# Patient Record
Sex: FEMALE | Race: WHITE | URBAN - METROPOLITAN AREA
[De-identification: names, ages, dates, MRNs, and addresses within clinical notes are randomized per-mention and may not be internally consistent; named-entity substitution may affect disease eponyms.]

---

## 2017-10-19 ENCOUNTER — EMERGENCY (EMERGENCY)
Facility: HOSPITAL | Age: 52
LOS: 0 days | Discharge: HOME | End: 2017-10-19
Attending: EMERGENCY MEDICINE | Admitting: EMERGENCY MEDICINE
Payer: MEDICARE

## 2017-10-19 VITALS
HEART RATE: 85 BPM | SYSTOLIC BLOOD PRESSURE: 156 MMHG | OXYGEN SATURATION: 100 % | RESPIRATION RATE: 16 BRPM | WEIGHT: 164.91 LBS | TEMPERATURE: 98 F | DIASTOLIC BLOOD PRESSURE: 93 MMHG

## 2017-10-19 PROCEDURE — 73562 X-RAY EXAM OF KNEE 3: CPT | Mod: 26,RT

## 2017-10-19 PROCEDURE — 72040 X-RAY EXAM NECK SPINE 2-3 VW: CPT | Mod: 26

## 2017-10-19 PROCEDURE — 99282 EMERGENCY DEPT VISIT SF MDM: CPT

## 2017-10-19 RX ORDER — METHOTREXATE 2.5 MG/1
0 TABLET ORAL
Qty: 0 | Refills: 0 | COMMUNITY

## 2017-10-19 RX ORDER — GABAPENTIN 400 MG/1
0 CAPSULE ORAL
Qty: 0 | Refills: 0 | COMMUNITY

## 2017-10-19 RX ORDER — ESTROGENS, CONJUGATED 0.625 MG
0 TABLET ORAL
Qty: 0 | Refills: 0 | COMMUNITY

## 2017-10-19 RX ORDER — RANITIDINE HYDROCHLORIDE 150 MG/1
0 TABLET, FILM COATED ORAL
Qty: 0 | Refills: 0 | COMMUNITY

## 2017-10-19 RX ORDER — DULOXETINE HYDROCHLORIDE 30 MG/1
0 CAPSULE, DELAYED RELEASE ORAL
Qty: 0 | Refills: 0 | COMMUNITY

## 2017-10-19 RX ORDER — KETOCONAZOLE 200 MG
0 TABLET ORAL
Qty: 0 | Refills: 0 | COMMUNITY

## 2017-10-19 RX ORDER — IBUPROFEN 200 MG
0 TABLET ORAL
Qty: 0 | Refills: 0 | COMMUNITY

## 2017-10-19 RX ORDER — LOSARTAN POTASSIUM 100 MG/1
0 TABLET, FILM COATED ORAL
Qty: 0 | Refills: 0 | COMMUNITY

## 2017-10-19 RX ORDER — MORPHINE SULFATE AND NALTREXONE HYDROCHLORIDE 50; 2 MG/1; MG/1
0 CAPSULE, EXTENDED RELEASE ORAL
Qty: 0 | Refills: 0 | COMMUNITY

## 2017-10-19 RX ORDER — SOLIFENACIN SUCCINATE 10 MG/1
0 TABLET ORAL
Qty: 0 | Refills: 0 | COMMUNITY

## 2017-10-19 RX ORDER — DICLOFENAC SODIUM 75 MG/1
0 TABLET, DELAYED RELEASE ORAL
Qty: 0 | Refills: 0 | COMMUNITY

## 2017-10-19 RX ORDER — IBUPROFEN 200 MG
800 TABLET ORAL ONCE
Qty: 0 | Refills: 0 | Status: COMPLETED | OUTPATIENT
Start: 2017-10-19 | End: 2017-10-19

## 2017-10-19 RX ORDER — OXYBUTYNIN CHLORIDE 5 MG
0 TABLET ORAL
Qty: 0 | Refills: 0 | COMMUNITY

## 2017-10-19 RX ADMIN — Medication 800 MILLIGRAM(S): at 16:50

## 2017-10-19 NOTE — ED STATDOCS - MUSCULOSKELETAL, MLM
posterior C/T/L paraspinal TTP, no midline tenderness, no step off deformities, R knee joint line tenderness, no deformity, no bony RUE tenderness.
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
## 2017-10-19 NOTE — ED STATDOCS - PHYSICAL EXAMINATION
GEN: AOX3, NAD. HEENT: Throat clear. Head NC/AT. NECK: Supple, +Mild muscular tenderness lower posterior neck paravertebral area. No JVD. FROM. C-spine non-tender. CV:S1S2, RRR, LUNGS: CTA/b/l, no w/r/r. ABD: Soft, NT/ND, no rebound, no guarding. No CVAT. EXT: No e/c/c. 2+ distal pulses. RIGHT KNEE: +Mild tenderness right knee. FROM with mild pain. No deformity. NVI. 2+ distal pulses. SKIN: No rashes. NEURO: No focal deficits. CN II-XII intact. FROM. 5/5 motor and sensory. RAMYA Chavez

## 2017-10-19 NOTE — ED STATDOCS - MEDICAL DECISION MAKING DETAILS
XR knee WNL.  Back an neck pain likely 2/2 muscular pain.  Already taking percocet and motrin at home.  Okay for d/c home at this time.
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
## 2017-10-19 NOTE — ED STATDOCS - PROGRESS NOTE DETAILS
Patient is feeling much better, tests/labs reviewed. case discussed with attending. OK to dc home. RAMYA Chavez

## 2017-10-19 NOTE — ED STATDOCS - CARE PLAN
Principal Discharge DX:	Knee injury  Instructions for follow-up, activity and diet:	patient already takes Percocet/Motrin at home for her arthritis, will continue same.  Secondary Diagnosis:	Neck sprain
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
## 2017-10-20 DIAGNOSIS — S13.9XXA SPRAIN OF JOINTS AND LIGAMENTS OF UNSPECIFIED PARTS OF NECK, INITIAL ENCOUNTER: ICD-10-CM
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
## 2017-10-20 DIAGNOSIS — M54.2 CERVICALGIA: ICD-10-CM

## 2017-10-20 DIAGNOSIS — M79.7 FIBROMYALGIA: ICD-10-CM

## 2017-10-20 DIAGNOSIS — S89.91XA UNSPECIFIED INJURY OF RIGHT LOWER LEG, INITIAL ENCOUNTER: ICD-10-CM

## 2017-10-20 DIAGNOSIS — M19.90 UNSPECIFIED OSTEOARTHRITIS, UNSPECIFIED SITE: ICD-10-CM

## 2018-11-27 ENCOUNTER — EMERGENCY (EMERGENCY)
Facility: HOSPITAL | Age: 53
LOS: 0 days | Discharge: HOME | End: 2018-11-28
Attending: EMERGENCY MEDICINE | Admitting: EMERGENCY MEDICINE
Payer: MEDICARE

## 2018-11-27 VITALS
HEIGHT: 62 IN | WEIGHT: 186.07 LBS | SYSTOLIC BLOOD PRESSURE: 170 MMHG | TEMPERATURE: 99 F | DIASTOLIC BLOOD PRESSURE: 90 MMHG | HEART RATE: 78 BPM | RESPIRATION RATE: 18 BRPM | OXYGEN SATURATION: 100 %
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
## 2018-11-27 DIAGNOSIS — S09.90XA UNSPECIFIED INJURY OF HEAD, INITIAL ENCOUNTER: ICD-10-CM

## 2018-11-27 DIAGNOSIS — M12.9 ARTHROPATHY, UNSPECIFIED: ICD-10-CM

## 2018-11-27 DIAGNOSIS — M50.30 OTHER CERVICAL DISC DEGENERATION, UNSPECIFIED CERVICAL REGION: ICD-10-CM
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
## 2018-11-27 DIAGNOSIS — M79.7 FIBROMYALGIA: ICD-10-CM

## 2018-11-27 PROCEDURE — 99284 EMERGENCY DEPT VISIT MOD MDM: CPT | Mod: 25

## 2018-11-27 PROCEDURE — 72125 CT NECK SPINE W/O DYE: CPT | Mod: 26

## 2018-11-27 PROCEDURE — 70450 CT HEAD/BRAIN W/O DYE: CPT | Mod: 26

## 2018-11-27 RX ORDER — OXYCODONE AND ACETAMINOPHEN 5; 325 MG/1; MG/1
1 TABLET ORAL ONCE
Qty: 0 | Refills: 0 | Status: DISCONTINUED | OUTPATIENT
Start: 2018-11-27 | End: 2018-11-27

## 2018-11-27 RX ADMIN — OXYCODONE AND ACETAMINOPHEN 1 TABLET(S): 5; 325 TABLET ORAL at 23:37

## 2018-11-27 RX ADMIN — OXYCODONE AND ACETAMINOPHEN 1 TABLET(S): 5; 325 TABLET ORAL at 22:37

## 2018-11-27 NOTE — ED PROVIDER NOTE - OBJECTIVE STATEMENT
Pt is a 52 y/o F, with PMHx arthritis and fibromyalgia, BIBEMS s/p fall at home today. Pt was walking down the steps in socks when she tripped and slipped down the last two steps of the staircase. + head trauma, no LOC. Pt p/w swelling to the back of her head, HA, and mild posterior neck pain. denies dizziness and any other injuries. No anticoagulation. She has been ambulatory since the fall.

## 2018-11-27 NOTE — ED ADULT NURSE NOTE - CHPI ED NUR SYMPTOMS NEG
no nausea/no tingling/no weakness/no fever/no decreased eating/drinking/no chills/no vomiting/no dizziness

## 2018-11-27 NOTE — ED ADULT NURSE NOTE - NSIMPLEMENTINTERV_GEN_ALL_ED
Implemented All Fall Risk Interventions:  [place] to call system. Call bell, personal items and telephone within reach. Instruct patient to call for assistance. Room bathroom lighting operational. Non-slip footwear when patient is off stretcher. Physically safe environment: no spills, clutter or unnecessary equipment. Stretcher in lowest position, wheels locked, appropriate side rails in place. Provide visual cue, wrist band, yellow gown, etc. Monitor gait and stability. Monitor for mental status changes and reorient to person, place, and time. Review medications for side effects contributing to fall risk. Reinforce activity limits and safety measures with patient and family.

## 2018-11-27 NOTE — ED PROVIDER NOTE - CARE PLAN
Principal Discharge DX:	Head trauma, initial encounter  Secondary Diagnosis:	Contusion of head, unspecified part of head, initial encounter

## 2018-11-28 VITALS
RESPIRATION RATE: 19 BRPM | SYSTOLIC BLOOD PRESSURE: 157 MMHG | TEMPERATURE: 98 F | DIASTOLIC BLOOD PRESSURE: 89 MMHG | OXYGEN SATURATION: 100 % | HEART RATE: 71 BPM

## 2018-11-28 PROBLEM — M79.7 FIBROMYALGIA: Chronic | Status: ACTIVE | Noted: 2017-10-19

## 2018-11-28 PROBLEM — M19.90 UNSPECIFIED OSTEOARTHRITIS, UNSPECIFIED SITE: Chronic | Status: ACTIVE | Noted: 2017-10-19

## 2018-11-28 RX ORDER — OXYCODONE AND ACETAMINOPHEN 5; 325 MG/1; MG/1
1 TABLET ORAL ONCE
Qty: 0 | Refills: 0 | Status: DISCONTINUED | OUTPATIENT
Start: 2018-11-28 | End: 2018-11-28

## 2018-11-28 RX ADMIN — OXYCODONE AND ACETAMINOPHEN 1 TABLET(S): 5; 325 TABLET ORAL at 00:23

## 2020-12-21 ENCOUNTER — EMERGENCY (EMERGENCY)
Facility: HOSPITAL | Age: 55
LOS: 0 days | Discharge: HOME | End: 2020-12-21
Attending: EMERGENCY MEDICINE
Payer: MEDICARE

## 2020-12-21 ENCOUNTER — TRANSCRIPTION ENCOUNTER (OUTPATIENT)
Age: 55
End: 2020-12-21

## 2020-12-21 VITALS
WEIGHT: 160.06 LBS | DIASTOLIC BLOOD PRESSURE: 75 MMHG | SYSTOLIC BLOOD PRESSURE: 155 MMHG | HEIGHT: 62 IN | RESPIRATION RATE: 18 BRPM | TEMPERATURE: 98 F | OXYGEN SATURATION: 100 % | HEART RATE: 79 BPM

## 2020-12-21 VITALS
SYSTOLIC BLOOD PRESSURE: 143 MMHG | DIASTOLIC BLOOD PRESSURE: 85 MMHG | TEMPERATURE: 98 F | RESPIRATION RATE: 16 BRPM | HEART RATE: 78 BPM | OXYGEN SATURATION: 100 %

## 2020-12-21 DIAGNOSIS — R53.83 OTHER FATIGUE: ICD-10-CM

## 2020-12-21 DIAGNOSIS — Z20.828 CONTACT WITH AND (SUSPECTED) EXPOSURE TO OTHER VIRAL COMMUNICABLE DISEASES: ICD-10-CM

## 2020-12-21 DIAGNOSIS — R53.81 OTHER MALAISE: ICD-10-CM

## 2020-12-21 DIAGNOSIS — B34.9 VIRAL INFECTION, UNSPECIFIED: ICD-10-CM

## 2020-12-21 DIAGNOSIS — M79.7 FIBROMYALGIA: ICD-10-CM

## 2020-12-21 DIAGNOSIS — I10 ESSENTIAL (PRIMARY) HYPERTENSION: ICD-10-CM

## 2020-12-21 DIAGNOSIS — M19.90 UNSPECIFIED OSTEOARTHRITIS, UNSPECIFIED SITE: ICD-10-CM

## 2020-12-21 PROCEDURE — 93005 ELECTROCARDIOGRAM TRACING: CPT

## 2020-12-21 PROCEDURE — 99284 EMERGENCY DEPT VISIT MOD MDM: CPT | Mod: CS

## 2020-12-21 PROCEDURE — 99283 EMERGENCY DEPT VISIT LOW MDM: CPT | Mod: 25

## 2020-12-21 PROCEDURE — 93010 ELECTROCARDIOGRAM REPORT: CPT

## 2020-12-21 PROCEDURE — 71045 X-RAY EXAM CHEST 1 VIEW: CPT | Mod: 26

## 2020-12-21 PROCEDURE — 71045 X-RAY EXAM CHEST 1 VIEW: CPT

## 2020-12-21 RX ORDER — SODIUM CHLORIDE 9 MG/ML
1000 INJECTION INTRAMUSCULAR; INTRAVENOUS; SUBCUTANEOUS ONCE
Refills: 0 | Status: COMPLETED | OUTPATIENT
Start: 2020-12-21 | End: 2020-12-21

## 2020-12-21 RX ADMIN — SODIUM CHLORIDE 1000 MILLILITER(S): 9 INJECTION INTRAMUSCULAR; INTRAVENOUS; SUBCUTANEOUS at 17:58

## 2020-12-21 NOTE — ED ADULT NURSE NOTE - CHPI ED NUR SYMPTOMS POS
previous_biopsy_has_been_previously_biopsied Body Location Override (Optional): Left Inferior Medial Mid Back WEAKNESS

## 2020-12-21 NOTE — ED ADULT NURSE NOTE - CAS EDN DISCHARGE ASSESSMENT
no acute respiratory distress, respirations even and unlabored, ambulating with steady gait, voiding without difficulty/Alert and oriented to person, place and time

## 2020-12-21 NOTE — ED ADULT TRIAGE NOTE - CHIEF COMPLAINT QUOTE
Pt BIBA EMS s/p positive Covid test and "not feeling well, found to be hypotensive upper 80's systolic, sent from urgent care. IV access established prior to arrival by EMS #18 right arm. Pt now 155/75 s/p IV fluids. Pt denies chest pain or SOB.

## 2020-12-21 NOTE — ED PROVIDER NOTE - CLINICAL SUMMARY MEDICAL DECISION MAKING FREE TEXT BOX
Pt states she took her morphine, percocet and Losartan all at once later than her usual routine and while at Urgent Care felt lightheaded and BP was low so  sent pt to the ED. Pt received 1 L normal saline by EMS and feels back to baseline and wants to go home. Pt tested COVID + at . Pt states she took her morphine, percocet and Losartan all at once later than her usual routine and while at Urgent Care felt lightheaded and BP was low so  sent pt to the ED. Pt received 1 L normal saline by EMS and feels back to baseline and wants to go home. Pt tested COVID + at . Plan; IV fluids, CXR, EKG. Pt states she took her morphine, percocet and Losartan all at once later than her usual routine and while at Urgent Care felt lightheaded and BP was low so  sent pt to the ED. Pt received 1 L normal saline by EMS and feels back to baseline and wants to go home. Pt tested COVID + at . normal o2 sat at rest & with exertion. Plan; IV fluids, CXR, EKG.

## 2020-12-21 NOTE — ED PROVIDER NOTE - PHYSICAL EXAMINATION
*GEN: No acute distress, well appearing   *HEAD: Normocephalic, Atraumatic  *EYES/NOSE: b/l Pupils symmetric & Reactive to ligth, EOMI b/l  *THROAT: airway patent, moist mucous membranes  *NECK: Neck supple  *PULMONARY: No Respiratory distress, symmetric b/l chest rise  *CARDIAC: s1s2, regular rhythm   *ABDOMEN:  Non Tender, Non Distended, soft, no guarding, no rebound, no masses   *BACK: no CVA tenderness, No midline vertebral tenderness to palpation   *EXTREMITIES: symmetric pulses, 2+ DP & radial pulses, no cyanosis, no edema   *SKIN: no rash, no bruising   *NEUROLOGIC: alert,  full active & passive ROM in all 4 extremities,   *PSYCH: appropriate concern about symptoms, pleasant *GEN: No acute distress, well appearing   *HEAD: Normocephalic, Atraumatic  *EYES/NOSE: b/l Pupils symmetric & Reactive to ligth, EOMI b/l  *THROAT: airway patent, moist mucous membranes  *NECK: Neck supple  *PULMONARY: No Respiratory distress, symmetric b/l chest rise  *CARDIAC: s1s2, regular rhythm   *ABDOMEN:  Non Tender, Non Distended, soft, no guarding, no rebound, no masses   *BACK: no CVA tenderness, No midline vertebral tenderness to palpation   *EXTREMITIES: symmetric pulses, 2+ DP & radial pulses, no cyanosis, no edema   *SKIN: no rash, no bruising   *NEUROLOGIC: CN 2-12 intact, normal finger to nose & heel to shin; equal & normal strength & sensation in b/l UE/LE; full active & passive ROM in all extremeties,  no pronator drift, normal patellar reflex, normal gait,   *PSYCH: appropriate concern about symptoms, pleasant

## 2020-12-21 NOTE — ED PROVIDER NOTE - OBJECTIVE STATEMENT
Pertinent HPI/PMH/PSH/FHx/SHx and Review of Systems contained within  HPI: 56 y/o Patient p/w CC generalized fatigue and malaise x 2 days, new onset. Also with mild cough and diffuse myalgia. Family members at home are COVID +. Pt states she took her morphine, percocet and Losartan all at once later than her usual routine and while at Urgent Care felt lightheaded and BP was low so  sent pt to the ED. Pt tested COVID + at . Pt received 1 L normal saline by EMS and feels back to baseline and wants to go home.   PMH/PSH relevant for: RA on Methotrexate and received anti inflammatory shot 2x weekly, HTN, GERD, Fibromyalgia  ROS negative for: fever, Chest pain, SOB, Nausea, vomiting, diarrhea, abdominal pain, dysuria    FamilyHx and SocialHx not otherwise contributory Pertinent HPI/PMH/PSH/FHx/SHx and Review of Systems contained within  HPI: 54 y/o female p/w CC generalized fatigue and malaise x 2 days, new onset. Also with mild cough and diffuse myalgia. Family members at home are COVID +. Pt states she took her morphine, percocet and Losartan all at once later than her usual routine and while at Urgent Care felt lightheaded and BP was low so  sent pt to the ED. Pt tested COVID + at . Pt received 1 L normal saline by EMS and feels back to baseline and wants to go home.   PMH/PSH relevant for: RA on Methotrexate and received anti inflammatory shot 2x weekly, HTN, GERD, Fibromyalgia  ROS negative for: fever, Chest pain, SOB, Nausea, vomiting, diarrhea, abdominal pain, dysuria    FamilyHx and SocialHx not otherwise contributory Pertinent HPI/PMH/PSH/FHx/SHx and Review of Systems contained within  HPI: 54 y/o female p/w CC generalized fatigue and malaise x 2 days, new onset. Also with mild cough and diffuse myalgia. Family members at home are COVID +. Pt tested COVID + at . Pt states she took her morphine, percocet and Losartan all at once later than her usual routine and while at Urgent Care felt lightheaded and BP was low so  sent pt to the ED. Pt received 1 L normal saline by EMS and feels back to baseline and wants to go home.   PMH/PSH relevant for: RA on Methotrexate and received anti inflammatory shot 2x weekly, HTN, GERD, Fibromyalgia  ROS negative for: fever, Chest pain, SOB, Nausea, vomiting, diarrhea, abdominal pain, dysuria    FamilyHx and SocialHx not otherwise contributory

## 2020-12-21 NOTE — ED PROVIDER NOTE - NS ED ROS FT
Review of Systems:  	•	CONSTITUTIONAL: no fever, +fatigue   	•	SKIN: no rash  	•	RESPIRATORY: no shortness of breath, +cough  	•	CARDIAC: no chest pain  	•	GI:  no abd pain, no nausea, no vomiting, no diarrhea  	•	GENITO-URINARY:  no dysuria  	•	MUSCULOSKELETAL:  no back pain, +myalgia  	•	NEUROLOGIC: no weakness  	•	ALLERGY: no rhinorrhea  	•	PSYSCHIATRIC: appropriate concern about symptoms

## 2020-12-21 NOTE — ED PROVIDER NOTE - PATIENT PORTAL LINK FT
You can access the FollowMyHealth Patient Portal offered by [facility] by registering at the following website: [email or web address]. By joining [facility]’s FollowMyHealth portal, you will also be able to view your health information using other applications (apps) compatible with our system.

## 2021-04-12 ENCOUNTER — EMERGENCY (EMERGENCY)
Facility: HOSPITAL | Age: 56
LOS: 0 days | Discharge: HOME | End: 2021-04-13
Attending: HOSPITALIST
Payer: MEDICARE

## 2021-04-12 VITALS
TEMPERATURE: 99 F | OXYGEN SATURATION: 97 % | DIASTOLIC BLOOD PRESSURE: 66 MMHG | RESPIRATION RATE: 18 BRPM | SYSTOLIC BLOOD PRESSURE: 103 MMHG | HEART RATE: 81 BPM

## 2021-04-12 VITALS — WEIGHT: 164.91 LBS | HEIGHT: 62 IN

## 2021-04-12 DIAGNOSIS — R19.7 DIARRHEA, UNSPECIFIED: ICD-10-CM

## 2021-04-12 DIAGNOSIS — R10.9 UNSPECIFIED ABDOMINAL PAIN: ICD-10-CM

## 2021-04-12 DIAGNOSIS — I10 ESSENTIAL (PRIMARY) HYPERTENSION: ICD-10-CM

## 2021-04-12 DIAGNOSIS — N39.0 URINARY TRACT INFECTION, SITE NOT SPECIFIED: ICD-10-CM

## 2021-04-12 DIAGNOSIS — Z20.822 CONTACT WITH AND (SUSPECTED) EXPOSURE TO COVID-19: ICD-10-CM

## 2021-04-12 DIAGNOSIS — M79.7 FIBROMYALGIA: ICD-10-CM

## 2021-04-12 DIAGNOSIS — Z79.891 LONG TERM (CURRENT) USE OF OPIATE ANALGESIC: ICD-10-CM

## 2021-04-12 DIAGNOSIS — M19.90 UNSPECIFIED OSTEOARTHRITIS, UNSPECIFIED SITE: ICD-10-CM

## 2021-04-12 DIAGNOSIS — K52.9 NONINFECTIVE GASTROENTERITIS AND COLITIS, UNSPECIFIED: ICD-10-CM

## 2021-04-12 LAB
ALBUMIN SERPL ELPH-MCNC: 3.5 G/DL — SIGNIFICANT CHANGE UP (ref 3.3–5)
ALP SERPL-CCNC: 74 U/L — SIGNIFICANT CHANGE UP (ref 40–120)
ALT FLD-CCNC: 48 U/L — SIGNIFICANT CHANGE UP (ref 12–78)
ANION GAP SERPL CALC-SCNC: 5 MMOL/L — SIGNIFICANT CHANGE UP (ref 5–17)
APPEARANCE UR: ABNORMAL
AST SERPL-CCNC: 38 U/L — HIGH (ref 15–37)
BASOPHILS # BLD AUTO: 0.05 K/UL — SIGNIFICANT CHANGE UP (ref 0–0.2)
BASOPHILS NFR BLD AUTO: 0.4 % — SIGNIFICANT CHANGE UP (ref 0–2)
BILIRUB SERPL-MCNC: 0.7 MG/DL — SIGNIFICANT CHANGE UP (ref 0.2–1.2)
BILIRUB UR-MCNC: ABNORMAL
BUN SERPL-MCNC: 28 MG/DL — HIGH (ref 7–23)
CALCIUM SERPL-MCNC: 8.7 MG/DL — SIGNIFICANT CHANGE UP (ref 8.5–10.1)
CHLORIDE SERPL-SCNC: 100 MMOL/L — SIGNIFICANT CHANGE UP (ref 96–108)
CO2 SERPL-SCNC: 25 MMOL/L — SIGNIFICANT CHANGE UP (ref 22–31)
COLOR SPEC: ABNORMAL
CREAT SERPL-MCNC: 0.7 MG/DL — SIGNIFICANT CHANGE UP (ref 0.5–1.3)
DIFF PNL FLD: ABNORMAL
EOSINOPHIL # BLD AUTO: 0.13 K/UL — SIGNIFICANT CHANGE UP (ref 0–0.5)
EOSINOPHIL NFR BLD AUTO: 0.9 % — SIGNIFICANT CHANGE UP (ref 0–6)
GLUCOSE SERPL-MCNC: 141 MG/DL — HIGH (ref 70–99)
GLUCOSE UR QL: NEGATIVE MG/DL — SIGNIFICANT CHANGE UP
HCT VFR BLD CALC: 43.9 % — SIGNIFICANT CHANGE UP (ref 34.5–45)
HGB BLD-MCNC: 15 G/DL — SIGNIFICANT CHANGE UP (ref 11.5–15.5)
IMM GRANULOCYTES NFR BLD AUTO: 0.2 % — SIGNIFICANT CHANGE UP (ref 0–1.5)
KETONES UR-MCNC: ABNORMAL
LACTATE SERPL-SCNC: 1.7 MMOL/L — SIGNIFICANT CHANGE UP (ref 0.7–2)
LEUKOCYTE ESTERASE UR-ACNC: ABNORMAL
LIDOCAIN IGE QN: 64 U/L — LOW (ref 73–393)
LYMPHOCYTES # BLD AUTO: 1.56 K/UL — SIGNIFICANT CHANGE UP (ref 1–3.3)
LYMPHOCYTES # BLD AUTO: 11.3 % — LOW (ref 13–44)
MCHC RBC-ENTMCNC: 33.6 PG — SIGNIFICANT CHANGE UP (ref 27–34)
MCHC RBC-ENTMCNC: 34.2 GM/DL — SIGNIFICANT CHANGE UP (ref 32–36)
MCV RBC AUTO: 98.2 FL — SIGNIFICANT CHANGE UP (ref 80–100)
MONOCYTES # BLD AUTO: 0.72 K/UL — SIGNIFICANT CHANGE UP (ref 0–0.9)
MONOCYTES NFR BLD AUTO: 5.2 % — SIGNIFICANT CHANGE UP (ref 2–14)
NEUTROPHILS # BLD AUTO: 11.31 K/UL — HIGH (ref 1.8–7.4)
NEUTROPHILS NFR BLD AUTO: 82 % — HIGH (ref 43–77)
NITRITE UR-MCNC: POSITIVE
PH UR: 5 — SIGNIFICANT CHANGE UP (ref 5–8)
PLATELET # BLD AUTO: 273 K/UL — SIGNIFICANT CHANGE UP (ref 150–400)
POTASSIUM SERPL-MCNC: 4.3 MMOL/L — SIGNIFICANT CHANGE UP (ref 3.5–5.3)
POTASSIUM SERPL-SCNC: 4.3 MMOL/L — SIGNIFICANT CHANGE UP (ref 3.5–5.3)
PROT SERPL-MCNC: 7 GM/DL — SIGNIFICANT CHANGE UP (ref 6–8.3)
PROT UR-MCNC: 15 MG/DL
RBC # BLD: 4.47 M/UL — SIGNIFICANT CHANGE UP (ref 3.8–5.2)
RBC # FLD: 12.4 % — SIGNIFICANT CHANGE UP (ref 10.3–14.5)
SODIUM SERPL-SCNC: 130 MMOL/L — LOW (ref 135–145)
SP GR SPEC: 1.02 — SIGNIFICANT CHANGE UP (ref 1.01–1.02)
UROBILINOGEN FLD QL: 4 MG/DL
WBC # BLD: 13.8 K/UL — HIGH (ref 3.8–10.5)
WBC # FLD AUTO: 13.8 K/UL — HIGH (ref 3.8–10.5)

## 2021-04-12 PROCEDURE — 81001 URINALYSIS AUTO W/SCOPE: CPT

## 2021-04-12 PROCEDURE — 96374 THER/PROPH/DIAG INJ IV PUSH: CPT | Mod: XU

## 2021-04-12 PROCEDURE — 83690 ASSAY OF LIPASE: CPT

## 2021-04-12 PROCEDURE — 85025 COMPLETE CBC W/AUTO DIFF WBC: CPT

## 2021-04-12 PROCEDURE — 99284 EMERGENCY DEPT VISIT MOD MDM: CPT | Mod: 25

## 2021-04-12 PROCEDURE — 83605 ASSAY OF LACTIC ACID: CPT

## 2021-04-12 PROCEDURE — 74177 CT ABD & PELVIS W/CONTRAST: CPT | Mod: 26

## 2021-04-12 PROCEDURE — 0225U NFCT DS DNA&RNA 21 SARSCOV2: CPT

## 2021-04-12 PROCEDURE — 99284 EMERGENCY DEPT VISIT MOD MDM: CPT | Mod: CS

## 2021-04-12 PROCEDURE — 87186 SC STD MICRODIL/AGAR DIL: CPT

## 2021-04-12 PROCEDURE — 80053 COMPREHEN METABOLIC PANEL: CPT

## 2021-04-12 PROCEDURE — 87086 URINE CULTURE/COLONY COUNT: CPT

## 2021-04-12 PROCEDURE — 36415 COLL VENOUS BLD VENIPUNCTURE: CPT

## 2021-04-12 PROCEDURE — 74177 CT ABD & PELVIS W/CONTRAST: CPT

## 2021-04-12 RX ORDER — SODIUM CHLORIDE 9 MG/ML
1000 INJECTION INTRAMUSCULAR; INTRAVENOUS; SUBCUTANEOUS ONCE
Refills: 0 | Status: COMPLETED | OUTPATIENT
Start: 2021-04-12 | End: 2021-04-12

## 2021-04-12 RX ADMIN — SODIUM CHLORIDE 1000 MILLILITER(S): 9 INJECTION INTRAMUSCULAR; INTRAVENOUS; SUBCUTANEOUS at 22:24

## 2021-04-12 NOTE — ED STATDOCS - OBJECTIVE STATEMENT
56 y/o female with PMHx of Fibromyalgia and HTN presenting with diarrhea since 1pm and abd pain today. Pt also c/o runny nose and mild headache. Pt was concerned for COVID, but reports earlier COVID episode and states she's currently fully vaccinated. PT states she went to urgent care and was sent to ED for further evaluation. Pt noted to be tender to RLQ. Denies fevers. Pt currently on Prednisone x 1 week, 5mg.

## 2021-04-12 NOTE — ED STATDOCS - PHYSICAL EXAMINATION
PA NOTE: GEN: AOX3, NAD. HEENT: Throat clear. Airway is patent. EYES: PERRLA. EOMI. Head: NC/AT. NECK: Supple, No JVD. FROM. C-spine non-tender. CV:S1S2, RRR, LUNGS: Non-labored breathing, no tachypnea. O2sat 100% RA. CTA b/l. No w/r/r. CHEST: Equal chest expansion and rise. No deformity. ABD: Soft, +Mild tenderness lower abd/suprapubic area. No true McBurney's point tenderness. No rebound, no guarding. No CVAT. EXT: No e/c/c. 2+ distal pulses. SKIN: No rashes. NEURO: No focal deficits. CN II-XII intact. FROM. 5/5 motor and sensory. ~Yohannes Pena PA-C

## 2021-04-12 NOTE — ED ADULT NURSE NOTE - OBJECTIVE STATEMENT
54 y/o female with PMHx of Fibromyalgia and HTN presenting with diarrhea since 1pm and abd pain today. Pt also c/o runny nose and mild headache. Pt was concerned for COVID, but reports earlier COVID episode and states she's currently fully vaccinated. PT states she went to urgent care and was sent to ED for further evaluation. Pt noted to be tender to RLQ. Denies fevers. Pt currently on Prednisone x 1 week, 5mg.

## 2021-04-12 NOTE — ED STATDOCS - CARE PROVIDER_API CALL
Jeannine Benavides  GASTROENTEROLOGY  [address], [address]  [place] [address]  Phone: [phone]  Fax: [phone]  Follow Up Time: 1-3 Days

## 2021-04-12 NOTE — ED STATDOCS - NSFOLLOWUPINSTRUCTIONS_ED_ALL_ED_FT
Please take both antibiotics as prescribed  recommend a light diet while you are healing  please return for any worsening pain or fever  please follow up with gastroenterology as below.
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
## 2021-04-12 NOTE — ED ADULT NURSE NOTE - NSFALLRSKINDICATORS_ED_ALL_ED
Patient seen and examined. Agree with above. The PEG tube itself is working fine - we injected 120cc of water and there's no leaking from the tube itself. The area surrounding and beneath the external bumper of the PEG is ulcerated and irritated. The tract also appears to be ulcerated - therefore it is the skin around the PEG that is the issue right now, not the PEG itself. Would first obtain wound care eval for this area to dress it appropriately. If it does not heal, we might have to remove PEG to allow area to heal and replace it at a later time. Exchanging the PEG to a bigger size would not help the already ulcerated/irriated skin already present. no

## 2021-04-12 NOTE — ED STATDOCS - PROGRESS NOTE DETAILS
PA: Patient is a 56 y/o female with PMHx of Fibromyalgia and HTN who presents to ED c/o diarrhea since 1pm and abd pain today. Patient also c/o runny nose and mild headache. Patient first went to urgent care and was sent to ED for further evaluation. Patient takes Prednisone for arthritis/Fibromyalgia. ~Yohannes Pena PA-C   Patient seen and evaluated in . Will get labs, CT. Reassess. ~Yohannes Pena PA-C PA: WBC slightly elevated. Waiting on CT. ~Yohannes Pena PA-C +UTI on urinalysis. Rocephin 1gm IV x1 given in ED. Waiting on CT. ~Yohannes Pena PA-C PA: CT done. Results pending. Care transitioned to Dr. Marsh. ~Yohannes Pena PA-C

## 2021-04-12 NOTE — ED STATDOCS - NS_ ATTENDINGSCRIBEDETAILS _ED_A_ED_FT
Shanna Long MD: The history, relevant review of systems, past medical and surgical history, medical decision making, and physical examination was documented by the scribe in my presence and I attest to the accuracy of the documentation.

## 2021-04-12 NOTE — ED STATDOCS - DATE/TIME 3
Refill request for Losartan    Last OV was 10/12/18    Last refill was 10/12/18 disp 90 refills 1    Prescription e-prescribed to pharmacy per Physician protocol    PSR, please notify patient due for appointment with Isabelle Loza DO       12-Apr-2021 23:24

## 2021-04-12 NOTE — ED STATDOCS - NS ED ATTENDING STATEMENT MOD
"Pt stated, \"I have been having stomach pain since last night around 9 pm it started with no relieve. I had a hard bowel movent at 3 am.\"  " Attending with

## 2021-04-12 NOTE — ED STATDOCS - CLINICAL SUMMARY MEDICAL DECISION MAKING FREE TEXT BOX
56 y/o female with diarrhea and RLQ pain. Pt is well appearing. Labs, CT, IV fluids. PT declining pain meds at this time. 56 y/o female with diarrhea and RLQ pain. Pt is well appearing. Labs, CT, IV fluids. PT declining pain meds at this time.    PA: Patient presents with lower abd pain. +UTI, Rocephin IV x 1 given in ED. CT done. Results pending. Care transitioned to Dr. Marsh. ~Yohannes Pena PA-C

## 2021-04-12 NOTE — ED STATDOCS - PATIENT PORTAL LINK FT
You can access the FollowMyHealth Patient Portal offered by [facility] by registering at the following website: [email or web address]. By joining [facility]’s FollowMyHealth portal, you will also be able to view your health information using other applications (apps) compatible with our system.

## 2021-04-12 NOTE — ED ADULT NURSE NOTE - CHIEF COMPLAINT QUOTE
Pt comes to the ED complaining of abdominal pain. Pt was sent from [facility] for a CT Scan and further evaluation.

## 2021-04-13 PROBLEM — I10 ESSENTIAL (PRIMARY) HYPERTENSION: Chronic | Status: ACTIVE | Noted: 2020-12-23

## 2021-04-13 LAB
RAPID RVP RESULT: SIGNIFICANT CHANGE UP
SARS-COV-2 RNA SPEC QL NAA+PROBE: SIGNIFICANT CHANGE UP

## 2021-04-13 RX ORDER — CEFTRIAXONE 500 MG/1
1000 INJECTION, POWDER, FOR SOLUTION INTRAMUSCULAR; INTRAVENOUS ONCE
Refills: 0 | Status: COMPLETED | OUTPATIENT
Start: 2021-04-12 | End: 2021-04-12

## 2021-04-13 RX ORDER — METRONIDAZOLE 500 MG
500 TABLET ORAL ONCE
Refills: 0 | Status: COMPLETED | OUTPATIENT
Start: 2021-04-13 | End: 2021-04-13

## 2021-04-13 RX ORDER — CIPROFLOXACIN LACTATE 400MG/40ML
500 VIAL (ML) INTRAVENOUS ONCE
Refills: 0 | Status: COMPLETED | OUTPATIENT
Start: 2021-04-13 | End: 2021-04-13

## 2021-04-13 RX ORDER — MOXIFLOXACIN HYDROCHLORIDE TABLETS, 400 MG 400 MG/1
1 TABLET, FILM COATED ORAL
Qty: 20 | Refills: 0
Start: 2021-04-13 | End: 2021-04-22

## 2021-04-13 RX ORDER — METRONIDAZOLE 500 MG
1 TABLET ORAL
Qty: 30 | Refills: 0
Start: 2021-04-13 | End: 2021-04-22

## 2021-04-13 RX ADMIN — CEFTRIAXONE 1000 MILLIGRAM(S): 500 INJECTION, POWDER, FOR SOLUTION INTRAMUSCULAR; INTRAVENOUS at 00:41

## 2021-04-13 RX ADMIN — Medication 500 MILLIGRAM(S): at 01:09

## 2021-07-14 NOTE — ED PROVIDER NOTE - NSFOLLOWUPINSTRUCTIONS_ED_ALL_ED_FT
Yes please follow up with your doctor in 2-3 days.   may apply ice for comfort.   return to ED for any concerns

## 2021-08-06 NOTE — ED PROVIDER NOTE - DISCHARGE DATE
[facility] Cardiology  Progress Note    Timo Olivo Patient Status:  Observation    10/28/1967 MRN [id number]   [facility] 2NE-A Attending Harish Vergara MD   Hosp Day # 0 PCP Emmanuel Watson MD     Impression:  1. CP, SILVIANO antacid (TUMS) chewable tab 500-1,000 mg, 500-1,000 mg, Oral, TID PRN  heparin (PORCINE) drip 18392hgdee/250mL infusion CONTINUOUS, 200-3,000 Units/hr, Intravenous, Continuous  nitroGLYCERIN (NITROSTAT) SL tab 0.4 mg, 0.4 mg, Sublingual, Q5 Min PRN  metopr 21-Dec-2020

## 2021-11-05 NOTE — ED ADULT NURSE NOTE - CHPI ED NUR SYMPTOMS NEG
no blood in stool/no burning urination/no hematuria/no nausea/no vomiting Opioid Pregnancy And Lactation Text: These medications can lead to premature delivery and should be avoided during pregnancy. These medications are also present in breast milk in small amounts.

## 2021-11-24 NOTE — ED ADULT TRIAGE NOTE - AS HEIGHT TYPE
Patient in bed, eyes closed, no distress noted, call light in reach, will continue to monitor, bed alarm on. stated

## 2022-01-07 NOTE — ED ADULT NURSE NOTE - OBJECTIVE STATEMENT
Pt presented to ED s/p fall. As per pt, pt's slipped and fell down from last 2 steps of stairs while walking down, striking her head. Upon arrival, pt noted to have slight swelling to back of head and mild neck pain. Denies LOC, not on blood thinners.
yes

## 2025-01-28 NOTE — ED STATDOCS - NSCAREINITIATED _GEN_ER
Orders for admission, patient is aware of plan and ready to go upstairs. Any questions, please call ED RN Kinsey QUACH at extension [phone].     Patient Covid vaccination status: Unvaccinated     COVID Test Ordered in ED: None    COVID Suspicion at Admission: N/A    Running Infusions:      Mental Status/LOC at time of transport: A&Ox4    Other pertinent information: ambulatory with steady gait  CIWA score: N/A   NIH score:  N/A         Ashok Kirby(Attending)